# Patient Record
Sex: MALE | Race: WHITE | Employment: OTHER | RURAL
[De-identification: names, ages, dates, MRNs, and addresses within clinical notes are randomized per-mention and may not be internally consistent; named-entity substitution may affect disease eponyms.]

---

## 2017-11-01 ENCOUNTER — TELEPHONE (OUTPATIENT)
Dept: FAMILY MEDICINE CLINIC | Age: 67
End: 2017-11-01

## 2017-11-01 NOTE — TELEPHONE ENCOUNTER
----- Message from Lance Stewart sent at 11/1/2017 11:31 AM EDT -----  Regarding: Dr. Stewart Palacio  Pt stated he is having surgery on next wk and has a catheter and experiencing some nausea and light dizziness, and was advised by his urologist to schedule an appt to see his pcp. Pt requested an appt for today or Friday. Best contact number [phone].

## 2017-11-03 ENCOUNTER — OFFICE VISIT (OUTPATIENT)
Dept: FAMILY MEDICINE CLINIC | Age: 67
End: 2017-11-03

## 2017-11-03 VITALS
SYSTOLIC BLOOD PRESSURE: 151 MMHG | TEMPERATURE: 98.9 F | OXYGEN SATURATION: 97 % | HEIGHT: 70 IN | DIASTOLIC BLOOD PRESSURE: 79 MMHG | HEART RATE: 72 BPM | BODY MASS INDEX: 25.48 KG/M2 | WEIGHT: 178 LBS

## 2017-11-03 DIAGNOSIS — I25.10 ASCVD (ARTERIOSCLEROTIC CARDIOVASCULAR DISEASE): ICD-10-CM

## 2017-11-03 DIAGNOSIS — R55 NEAR SYNCOPE: Primary | ICD-10-CM

## 2017-11-03 DIAGNOSIS — N40.1 BENIGN PROSTATIC HYPERPLASIA WITH URINARY OBSTRUCTION: ICD-10-CM

## 2017-11-03 DIAGNOSIS — F43.22 ADJUSTMENT DISORDER WITH ANXIETY: ICD-10-CM

## 2017-11-03 DIAGNOSIS — N13.8 BENIGN PROSTATIC HYPERPLASIA WITH URINARY OBSTRUCTION: ICD-10-CM

## 2017-11-03 PROBLEM — R33.9 RETENTION OF URINE: Status: ACTIVE | Noted: 2017-10-08

## 2017-11-03 RX ORDER — TAMSULOSIN HYDROCHLORIDE 0.4 MG/1
0.4 CAPSULE ORAL
COMMUNITY

## 2017-11-03 RX ORDER — ALPRAZOLAM 0.25 MG/1
0.25 TABLET ORAL
Qty: 30 TAB | Refills: 0 | Status: SHIPPED | OUTPATIENT
Start: 2017-11-03

## 2017-11-03 NOTE — PATIENT INSTRUCTIONS
Lightheadedness or Faintness: Care Instructions  Your Care Instructions  Lightheadedness is a feeling that you are about to faint or \"pass out. \" You do not feel as if you or your surroundings are moving. It is different from vertigo, which is the feeling that you or things around you are spinning or tilting. Lightheadedness usually goes away or gets better when you lie down. If lightheadedness gets worse, it can lead to a fainting spell. It is common to feel lightheaded from time to time. Lightheadedness usually is not caused by a serious problem. It often is caused by a short-lasting drop in blood pressure and blood flow to your head that occurs when you get up too quickly from a seated or lying position. Follow-up care is a key part of your treatment and safety. Be sure to make and go to all appointments, and call your doctor if you are having problems. It's also a good idea to know your test results and keep a list of the medicines you take. How can you care for yourself at home? · Lie down for 1 or 2 minutes when you feel lightheaded. After lying down, sit up slowly and remain sitting for 1 to 2 minutes before slowly standing up. · Avoid movements, positions, or activities that have made you lightheaded in the past.  · Get plenty of rest, especially if you have a cold or flu, which can cause lightheadedness. · Make sure you drink plenty of fluids, especially if you have a fever or have been sweating. · Do not drive or put yourself and others in danger while you feel lightheaded. When should you call for help? Call 911 anytime you think you may need emergency care. For example, call if:  ? · You have symptoms of a stroke. These may include:  ¨ Sudden numbness, tingling, weakness, or loss of movement in your face, arm, or leg, especially on only one side of your body. ¨ Sudden vision changes. ¨ Sudden trouble speaking. ¨ Sudden confusion or trouble understanding simple statements.   ¨ Sudden problems with walking or balance. ¨ A sudden, severe headache that is different from past headaches. ? · You have symptoms of a heart attack. These may include:  ¨ Chest pain or pressure, or a strange feeling in the chest.  ¨ Sweating. ¨ Shortness of breath. ¨ Nausea or vomiting. ¨ Pain, pressure, or a strange feeling in the back, neck, jaw, or upper belly or in one or both shoulders or arms. ¨ Lightheadedness or sudden weakness. ¨ A fast or irregular heartbeat. After you call 911, the  may tell you to chew 1 adult-strength or 2 to 4 low-dose aspirin. Wait for an ambulance. Do not try to drive yourself. ? Watch closely for changes in your health, and be sure to contact your doctor if:  ? · Your lightheadedness gets worse or does not get better with home care. Where can you learn more? Go to http://tree-xiao.info/. Enter M692 in the search box to learn more about \"Lightheadedness or Faintness: Care Instructions. \"  Current as of: March 20, 2017  Content Version: 11.4  © 6641-9747 [employer]. Care instructions adapted under license by [employer] (which disclaims liability or warranty for this information). If you have questions about a medical condition or this instruction, always ask your healthcare professional. [address] any warranty or liability for your use of this information.

## 2017-11-03 NOTE — PROGRESS NOTES
Carmen Stevenson is a 78 y.o. male presenting for/with:    Nausea (having surgery   later this month)    HPI:  Spells of nausea and lightheadedness   Had acute urinary retention last mo and while he was tx with escalante catheter, passed out in the ER. Was revived without injury, but over past couple weeks has con't to feel like the blood is draining out of his head, no tunnel vision, sometimes assoc with nausea. Not assoc with CP or pressure, resolves with water PO. Hasn't had feelings of spinning or tilting, and hasn't had any sx with getting into or out of bed or rolling over in bed. Had normal EKG at [facility] ER, nl GFR and lytes. Didn't get any cardiac enz though. Symptoms include spell of acute urinary retention 10/7, seen at [facility]. ER notes, labs reviewed with care everywere. Had signs of POS NIT and Blood on UA, but cx was no growth. Tx with abx and a catheter. Tried pulling the catheter a week later with [facility] with flomax on board, but still couldn't void. Catheter replaced and has leg back, no problems with it. Had [address], told prostate size was 88 (enlarged). Plans ablation at [place] with aqua-ablation with Dr. Brandee Hagan. PMH, SH, Medications/Allergies: reviewed, on chart.     ROS:  Constitutional: No fever, chills or weight loss  Respiratory: No cough, SOB   CV: No chest pain or Palpitations    Visit Vitals    /79    Pulse 72    Temp 98.9 °F (37.2 °C) (Temporal)    Ht 5' 10\" (1.778 m)    Wt 178 lb (80.7 kg)    SpO2 97%    BMI 25.54 kg/m2     Wt Readings from Last 3 Encounters:   11/03/17 178 lb (80.7 kg)     Physical Examination: General appearance - alert, well appearing, and in no distress  Mental status - alert, oriented to person, place, and time  Eyes - pupils equal and reactive, extraocular eye movements intact  ENT - bilateral external ears and nose normal. Normal lips  Neck - supple, no significant adenopathy, no thyromegaly or mass  Lymphatics - no palpable lymphadenopathy, no hepatosplenomegaly  Chest - clear to auscultation, no wheezes, rales or rhonchi, symmetric air entry  Heart - normal rate, regular rhythm, normal S1, S2, no murmurs, rubs, clicks or gallops  Extremities - peripheral pulses normal, no pedal edema, no clubbing or cyanosis  Neuro- CN 2-12 intact to test, nl coordination and ROM UE and LE. Nl gait. Nl elizabeth hallpike bilat. A/P  Near syncope  Unsure cause. No sign of BPPV or orthostasis on workup. Did have good looking lipids last check, has only sl elev BP's on no meds at age 78, which is reassuring from a CVD standpoint, but on chart review did have some ASCVD of the aorta on CT abd/pelvis. Will check carotid dopplers, and probably needs a GXT for preop risk assessment. Given his low lipids and high aerobic capacity, probably can get a plain GXT early next week, and if ok, should be ok for surgery. For long term mgmt of his aortic ASCVD, proably would benefit from tx with low dose statin and ASA 81mg daily, probably want to start that postop. BPH with urinary retention  Con't to f/U with Uro as directed. Urine in pouch is clear without heme. F/U next week/Per studies.

## 2017-11-03 NOTE — MR AVS SNAPSHOT
Visit Information Date & Time Provider Department Dept. Phone Encounter #  
 11/3/2017  3:40 PM Angeles Mixon MD Rubia Holbrook [id number] Upcoming Health Maintenance Date Due Hepatitis C Screening 1950 DTaP/Tdap/Td series (1 - Tdap) 3/26/1972 FOBT Q 1 YEAR AGE 50-75 3/26/2001 ZOSTER VACCINE AGE 60> 1/26/2011 GLAUCOMA SCREENING Q2Y 3/26/2016 Pneumococcal 65+ Low/Medium Risk (1 of 2 - PCV13) 3/26/2016 MEDICARE YEARLY EXAM 3/26/2016 INFLUENZA AGE 9 TO ADULT 8/1/2017 Allergies as of 11/3/2017  Review Complete On: 11/3/2017 By: Angeles Mixon MD  
 No Known Allergies Current Immunizations  Never Reviewed No immunizations on file. Not reviewed this visit You Were Diagnosed With   
  
 Codes Comments Near syncope    -  Primary ICD-10-CM: R55 
ICD-9-CM: 780.2 ASCVD (arteriosclerotic cardiovascular disease)     ICD-10-CM: I25.10 ICD-9-CM: 429.2, 440.9 Benign prostatic hyperplasia with urinary obstruction     ICD-10-CM: N40.1, N13.8 ICD-9-CM: 600.01, 599.69 Adjustment disorder with anxiety     ICD-10-CM: F43.22 
ICD-9-CM: 309.24 Vitals BP Pulse Temp Height(growth percentile) Weight(growth percentile) SpO2  
 151/79 72 98.9 °F (37.2 °C) (Temporal) 5' 10\" (1.778 m) 178 lb (80.7 kg) 97% BMI Smoking Status 25.54 kg/m2 Never Smoker Vitals History BMI and BSA Data Body Mass Index Body Surface Area 25.54 kg/m 2 2 m 2 Your Updated Medication List  
  
   
This list is accurate as of: 11/3/17  4:59 PM.  Always use your most recent med list.  
  
  
  
  
 ALPRAZolam 0.25 mg tablet Commonly known as:  Kaleta Lauren Take 1 Tab by mouth two (2) times daily as needed for Anxiety. Max Daily Amount: 0.5 mg.  
  
 tamsulosin 0.4 mg capsule Commonly known as:  FLOMAX Take 0.4 mg by mouth. Prescriptions Printed Refills ALPRAZolam (XANAX) 0.25 mg tablet 0 Sig: Take 1 Tab by mouth two (2) times daily as needed for Anxiety. Max Daily Amount: 0.5 mg.  
 Class: Print Route: Oral  
  
To-Do List   
 11/03/2017 Imaging:  DUPLEX CAROTID BILATERAL Introducing [address]! Brisa Ba introduces [employer] patient portal. Now you can access parts of your medical record, email your doctor's office, and request medication refills online. 1. In your internet browser, go to [email or web address] 2. Click on the First Time User? Click Here link in the Sign In box. You will see the New Member Sign Up page. 3. Enter your [employer] Access Code exactly as it appears below. You will not need to use this code after youve completed the sign-up process. If you do not sign up before the expiration date, you must request a new code. · [employer] Access Code: [email or web address] Expires: 2/1/2018  3:36 PM 
 
4. Enter the last four digits of your Social Security Number (xxxx) and Date of Birth (mm/dd/yyyy) as indicated and click Submit. You will be taken to the next sign-up page. 5. Create a [employer] ID. This will be your [employer] login ID and cannot be changed, so think of one that is secure and easy to remember. 6. Create a [employer] password. You can change your password at any time. 7. Enter your Password Reset Question and Answer. This can be used at a later time if you forget your password. 8. Enter your e-mail address. You will receive e-mail notification when new information is available in [address] 9. Click Sign Up. You can now view and download portions of your medical record. 10. Click the Download Summary menu link to download a portable copy of your medical information. If you have questions, please visit the Frequently Asked Questions section of the [employer] website. Remember, [employer] is NOT to be used for urgent needs. For medical emergencies, dial 911. Now available from your iPhone and Android! Please provide this summary of care documentation to your next provider. Your primary care clinician is listed as Gisela Baldwin. If you have any questions after today's visit, please call [phone].

## 2017-11-08 NOTE — PROGRESS NOTES
PT had normal GXT with avg funcitonal capacity, reached target HR, 8.5 METS, no ischemic changes. Optimized for surgery. Recommend proceeding as planned.

## 2017-11-08 NOTE — PROGRESS NOTES
Dopplers clear. Pt does need a GXT for preop, and due to research study criteria, he can't really have the prostate procedure re-scheduled. Will try to get him a regular GXT today, and if ok, is optimized for surgery.

## 2022-03-19 PROBLEM — N13.8 BENIGN PROSTATIC HYPERPLASIA WITH URINARY OBSTRUCTION: Status: ACTIVE | Noted: 2017-11-03

## 2022-03-19 PROBLEM — N40.1 BENIGN PROSTATIC HYPERPLASIA WITH URINARY OBSTRUCTION: Status: ACTIVE | Noted: 2017-11-03

## 2022-03-19 PROBLEM — I25.10 ASCVD (ARTERIOSCLEROTIC CARDIOVASCULAR DISEASE): Status: ACTIVE | Noted: 2017-11-03

## 2022-03-19 PROBLEM — R33.9 RETENTION OF URINE: Status: ACTIVE | Noted: 2017-10-08
